# Patient Record
Sex: FEMALE | NOT HISPANIC OR LATINO | URBAN - METROPOLITAN AREA
[De-identification: names, ages, dates, MRNs, and addresses within clinical notes are randomized per-mention and may not be internally consistent; named-entity substitution may affect disease eponyms.]

---

## 2023-10-03 ENCOUNTER — TELEPHONE (OUTPATIENT)
Dept: OBSTETRICS AND GYNECOLOGY | Facility: CLINIC | Age: 58
End: 2023-10-03
Payer: COMMERCIAL

## 2023-10-03 NOTE — TELEPHONE ENCOUNTER
No real data on pellets so I can't recommend.  Once she has used the estradiol vag inserts once at bedtime for 2 wks then she can continue twice weekly forever!

## 2023-10-06 NOTE — TELEPHONE ENCOUNTER
Lmtco    

## 2023-12-04 ENCOUNTER — TELEPHONE (OUTPATIENT)
Dept: OBSTETRICS AND GYNECOLOGY | Facility: CLINIC | Age: 58
End: 2023-12-04
Payer: COMMERCIAL

## 2023-12-04 DIAGNOSIS — N95.2 ATROPHIC VAGINITIS: Primary | ICD-10-CM

## 2023-12-04 RX ORDER — ESTRADIOL 0.1 MG/G
2 CREAM VAGINAL DAILY
COMMUNITY
Start: 2023-11-28 | End: 2023-12-05 | Stop reason: SDUPTHER

## 2023-12-05 RX ORDER — ESTRADIOL 0.1 MG/G
2 CREAM VAGINAL DAILY
Qty: 180 G | Refills: 3 | Status: SHIPPED | OUTPATIENT
Start: 2023-12-05 | End: 2024-03-04

## 2024-07-24 ENCOUNTER — TELEPHONE (OUTPATIENT)
Dept: OBSTETRICS AND GYNECOLOGY | Facility: CLINIC | Age: 59
End: 2024-07-24
Payer: COMMERCIAL

## 2024-07-24 DIAGNOSIS — N95.2 ATROPHIC VAGINITIS: ICD-10-CM

## 2024-07-24 RX ORDER — ESTRADIOL 0.1 MG/G
1 CREAM VAGINAL 2 TIMES WEEKLY
Qty: 42.5 G | Refills: 1 | Status: SHIPPED | OUTPATIENT
Start: 2024-07-25 | End: 2024-10-23

## 2024-07-24 RX ORDER — ESTRADIOL 0.1 MG/G
2 CREAM VAGINAL 2 TIMES WEEKLY
Qty: 42.5 G | Refills: 0 | Status: CANCELLED | OUTPATIENT
Start: 2024-07-25 | End: 2024-10-15

## 2024-07-24 NOTE — TELEPHONE ENCOUNTER
Est CS pt last seen 07/12/2023 /Pt has appt with WC 0/27/2024 Annual / pt calling states that she needs refill of her estradiol 0.01% cream vag until annual / pt [facility].

## 2024-08-25 ASSESSMENT — ENCOUNTER SYMPTOMS
ADENOPATHY: 0
UNEXPECTED WEIGHT CHANGE: 0
ABDOMINAL PAIN: 0
DIFFICULTY URINATING: 0
ACTIVITY CHANGE: 0
HEADACHES: 0
SHORTNESS OF BREATH: 0
CHEST TIGHTNESS: 0
COLOR CHANGE: 0
FATIGUE: 0
ABDOMINAL DISTENTION: 0
DIZZINESS: 0
DYSURIA: 0
WEAKNESS: 0
JOINT SWELLING: 0

## 2024-08-25 NOTE — PROGRESS NOTES
"Annual-menopause  Subjective   Estefani Bashir is a 58 y.o.  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   Complaints:   denies vag bleed or discharge; denies pelvic  pain, pressure, or persistent bloating. Reports good response to vag ert.       2023 menopausal sxs=vaginal discomfort/pain with sex. some hot flashes but they are manageable.  PMHx: HYPOTHYROID, MMR, PLANTAR FASCIITIS- BOTH FEET, Back problems.  Last pap  3/26/2021 neg, 1/31/15- Neg.  Abn pap  5/22/2008 ascus/ hpv neg.    Mamm 12/2023 neg at [facility].  Colposcopy 11/15/07 COLPO UNSAT. REPEAT PAP 6 [facility]- [facility] DONE.   Periods : s/pABLATION.  Last Period 02/2006.=age 41    STDs HSV.  currently sexually active.    Other 2023 colonoscopy.   Total preg 2: 12/6/1994 1 C/S \" CHRISTOPHER\"; 12/23/1996  \"SHAN\", Repeat C/S.   Surg Hx: NOVASURE ABLATION 2/2006   REDUCTION MAMMOPLASTY =2001   APPY 1982,   C-SECT 12/6/1994, C-SECT 12/23/1996,   rt knee surgery 2008, caudal block 4552-4343.  Father: alive, arthritis, dm, htn.  Mother: alive, ARTHRITIS   3/26/2021-CRA= NEG BREAST-9.5% NEG.  Social History:  former smoker/1-5 years. Alcohol-socially  Review of Systems   Constitutional:  Negative for activity change, fatigue and unexpected weight change.   Respiratory:  Negative for chest tightness and shortness of breath.    Cardiovascular:  Negative for chest pain and leg swelling.   Gastrointestinal:  Negative for abdominal distention and abdominal pain.   Genitourinary:  Negative for difficulty urinating, dysuria, genital sores, pelvic pain, vaginal bleeding, vaginal discharge and vaginal pain.   Musculoskeletal:  Negative for gait problem and joint swelling.   Skin:  Negative for color change and rash.   Neurological:  Negative for dizziness, weakness and headaches.   Hematological:  Negative for adenopathy.   Objective Visit Vitals  /82   Wt 58.3 kg (128 lb 9.6 oz)   BMI 25.12 kg/m²   OB Status Postmenopausal "   Smoking Status Former   BSA 1.57 m²       General:   Alert and oriented, in no acute distress   Neck: Supple. No visible thyromegaly.    Breast/Axilla: S/p mastopexy/reduction; Normal to palpation bilaterally without masses, skin changes, or nipple discharge.    Abdomen: Soft, non-tender, without masses or organomegaly   Vulva: Normal architecture without erythema, masses, or lesions.    Vagina: Normal mucosa without lesions, masses.  Positive atrophy. No abnormal vaginal discharge.    Cervix: Normal without masses, lesions, or signs of cervicitis. Pap sent   Uterus: Normal mobile, non-enlarged uterus    Adnexa: No palpable masses or tenderness   Pelvic Floor No POP noted. No high tone pelvic floor    Psych  Rectal Normal affect. Normal mood.      Assessment/Plan   Encounter Diagnoses   Name Primary?    Visit for gynecologic examination; both breast and pelvic exams are negative for any suspicious findings. Yes    Screening for human papillomavirus; Pap sent     Postmenopausal bone loss; DEXA warranted due to documented height loss/menopausal status and not on hormones.     Menopause; infrequent mild symptoms     Loss of height; indication for DEXA     [facility] wanting to continue to have these ordered by PCP.  Aury Salcedo MD

## 2024-08-27 ENCOUNTER — OFFICE VISIT (OUTPATIENT)
Dept: OBSTETRICS AND GYNECOLOGY | Facility: CLINIC | Age: 59
End: 2024-08-27
Payer: COMMERCIAL

## 2024-08-27 VITALS — SYSTOLIC BLOOD PRESSURE: 142 MMHG | DIASTOLIC BLOOD PRESSURE: 82 MMHG | BODY MASS INDEX: 25.12 KG/M2 | WEIGHT: 128.6 LBS

## 2024-08-27 DIAGNOSIS — Z11.51 SCREENING FOR HUMAN PAPILLOMAVIRUS: ICD-10-CM

## 2024-08-27 DIAGNOSIS — M81.0 POSTMENOPAUSAL BONE LOSS: ICD-10-CM

## 2024-08-27 DIAGNOSIS — R29.890 LOSS OF HEIGHT: ICD-10-CM

## 2024-08-27 DIAGNOSIS — Z01.419 VISIT FOR GYNECOLOGIC EXAMINATION: Primary | ICD-10-CM

## 2024-08-27 DIAGNOSIS — Z78.0 MENOPAUSE: ICD-10-CM

## 2024-08-27 PROCEDURE — 99213 OFFICE O/P EST LOW 20 MIN: CPT | Performed by: OBSTETRICS & GYNECOLOGY

## 2024-08-27 RX ORDER — MUPIROCIN 20 MG/G
OINTMENT TOPICAL
COMMUNITY
Start: 2024-08-18

## 2024-08-27 RX ORDER — THYROID 120 MG/1
TABLET ORAL
COMMUNITY
Start: 2024-01-04

## 2024-08-27 RX ORDER — DOXYCYCLINE HYCLATE 100 MG
TABLET ORAL
COMMUNITY
Start: 2024-08-18

## 2024-08-27 RX ORDER — FLUTICASONE PROPIONATE 50 MCG
SPRAY, SUSPENSION (ML) NASAL EVERY 24 HOURS
COMMUNITY

## 2024-08-27 RX ORDER — CYCLOSPORINE 0.5 MG/ML
EMULSION OPHTHALMIC EVERY 12 HOURS
COMMUNITY

## 2024-08-27 RX ORDER — VALSARTAN 320 MG/1
320 TABLET ORAL
COMMUNITY
Start: 2024-05-20

## 2024-08-27 RX ORDER — FLUOXETINE 10 MG/1
10 CAPSULE ORAL
COMMUNITY
Start: 2024-02-05

## 2024-08-27 RX ORDER — LISDEXAMFETAMINE DIMESYLATE 50 MG/1
CAPSULE ORAL
COMMUNITY

## 2024-08-27 RX ORDER — CHOLECALCIFEROL (VITAMIN D3) 25 MCG
1000 TABLET ORAL
COMMUNITY

## 2024-08-27 ASSESSMENT — PATIENT HEALTH QUESTIONNAIRE - PHQ9
2. FEELING DOWN, DEPRESSED OR HOPELESS: NOT AT ALL
1. LITTLE INTEREST OR PLEASURE IN DOING THINGS: NOT AT ALL
SUM OF ALL RESPONSES TO PHQ9 QUESTIONS 1 & 2: 0

## 2024-08-27 ASSESSMENT — PAIN SCALES - GENERAL: PAINLEVEL: 0-NO PAIN

## 2024-09-23 ENCOUNTER — HOSPITAL ENCOUNTER (OUTPATIENT)
Dept: RADIOLOGY | Facility: CLINIC | Age: 59
Discharge: HOME | End: 2024-09-23
Payer: COMMERCIAL

## 2024-09-23 DIAGNOSIS — R29.890 LOSS OF HEIGHT: ICD-10-CM

## 2024-09-23 DIAGNOSIS — Z78.0 MENOPAUSE: ICD-10-CM

## 2024-09-23 DIAGNOSIS — M81.0 POSTMENOPAUSAL BONE LOSS: ICD-10-CM

## 2024-09-23 PROCEDURE — 77080 DXA BONE DENSITY AXIAL: CPT | Performed by: RADIOLOGY

## 2024-09-23 PROCEDURE — 77080 DXA BONE DENSITY AXIAL: CPT

## 2024-11-20 DIAGNOSIS — N95.2 ATROPHIC VAGINITIS: ICD-10-CM

## 2024-11-20 RX ORDER — ESTRADIOL 0.1 MG/G
1 CREAM VAGINAL 2 TIMES WEEKLY
Qty: 42.5 G | Refills: 2 | Status: SHIPPED | OUTPATIENT
Start: 2024-11-21 | End: 2026-02-10